# Patient Record
Sex: MALE | Race: AMERICAN INDIAN OR ALASKA NATIVE | Employment: FULL TIME | URBAN - METROPOLITAN AREA
[De-identification: names, ages, dates, MRNs, and addresses within clinical notes are randomized per-mention and may not be internally consistent; named-entity substitution may affect disease eponyms.]

---
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
## 2021-05-26 ENCOUNTER — HOSPITAL ENCOUNTER (EMERGENCY)
Facility: CLINIC | Age: 21
Discharge: HOME | End: 2021-05-26
Attending: EMERGENCY MEDICINE | Admitting: EMERGENCY MEDICINE
Payer: COMMERCIAL

## 2021-05-26 VITALS
TEMPERATURE: 98 F | WEIGHT: 160 LBS | HEART RATE: 85 BPM | RESPIRATION RATE: 16 BRPM | OXYGEN SATURATION: 99 % | BODY MASS INDEX: 25.82 KG/M2

## 2021-05-26 DIAGNOSIS — R07.89 CHEST WALL TENDERNESS: ICD-10-CM

## 2021-05-26 DIAGNOSIS — S29.011A PECTORALIS MUSCLE STRAIN, INITIAL ENCOUNTER: ICD-10-CM

## 2021-05-26 PROCEDURE — 250N000013 HC RX MED GY IP 250 OP 250 PS 637: Performed by: EMERGENCY MEDICINE

## 2021-05-26 PROCEDURE — 93005 ELECTROCARDIOGRAM TRACING: CPT

## 2021-05-26 PROCEDURE — 99283 EMERGENCY DEPT VISIT LOW MDM: CPT

## 2021-05-26 RX ORDER — IBUPROFEN 600 MG/1
600 TABLET, FILM COATED ORAL EVERY 6 HOURS PRN
Qty: 60 TABLET | Refills: 0 | Status: SHIPPED | OUTPATIENT
Start: 2021-05-26 | End: 2021-06-25

## 2021-05-26 RX ORDER — IBUPROFEN 600 MG/1
600 TABLET, FILM COATED ORAL ONCE
Status: COMPLETED | OUTPATIENT
Start: 2021-05-26 | End: 2021-05-26

## 2021-05-26 RX ORDER — ACETAMINOPHEN 500 MG
1000 TABLET ORAL ONCE
Status: COMPLETED | OUTPATIENT
Start: 2021-05-26 | End: 2021-05-26

## 2021-05-26 RX ADMIN — ACETAMINOPHEN 1000 MG: 500 TABLET, FILM COATED ORAL at 22:15

## 2021-05-26 RX ADMIN — IBUPROFEN 600 MG: 600 TABLET, FILM COATED ORAL at 22:15

## 2021-05-26 ASSESSMENT — ENCOUNTER SYMPTOMS
NECK PAIN: 0
VOMITING: 0
BACK PAIN: 0
FEVER: 0
SHORTNESS OF BREATH: 0
DIARRHEA: 0
SORE THROAT: 0
ABDOMINAL PAIN: 0
NAUSEA: 0

## 2021-05-26 NOTE — LETTER
May 27, 2021      To Whom It May Concern:      Juan Ramon Carlin was seen in our Emergency Department today, 05/26/21.  I expect his condition to improve over the next 2-3 days.  He may return to work/school when improved.    Sincerely,        Berenice Gaines RN

## 2021-05-27 LAB — INTERPRETATION ECG - MUSE: NORMAL

## 2021-05-27 NOTE — ED PROVIDER NOTES
History   Chief Complaint:  Chest Wall Pain     HPI   Juan Ramon Carlin is a 21 year old male who presents with chest wall pain. Patient complains of left pectoral pain with movement. His pain resolves when he stops moving. He states the pain began on 05/17/21 and resolved shortly after but returned today at work. His job is labor intensive and involves lifting piping. Denies shortness of breath, fever, or sore throat. Denies rashes, nausea, vomiting, diarrhea, or abdominal pain. Denies neck or back pain. Denies hypertension, diabetes, or high cholesterol. He drinks alcohol socially and smokes tobacco. He denies a history of heart problems or a family history of heart disease. He is right hand dominant.     Review of Systems   Constitutional: Negative for fever.   HENT: Negative for sore throat.    Respiratory: Negative for shortness of breath.    Cardiovascular: Positive for chest pain.   Gastrointestinal: Negative for abdominal pain, diarrhea, nausea and vomiting.   Musculoskeletal: Negative for back pain and neck pain.   Skin: Negative for rash.   All other systems reviewed and are negative.    Allergies:  No known drug allergies    Medications:  The patient is not currently taking any prescribed medications.    Past Medical History:    Chronic Right Shoulder Pain    Social History:  Arrives unaccompanied.     Physical Exam     Patient Vitals for the past 24 hrs:   Temp Temp src Pulse Resp SpO2 Weight   05/26/21 2137 98  F (36.7  C) Temporal 85 16 99 % 72.6 kg (160 lb)       Physical Exam  General: Alert, appears well-developed and well-nourished. Cooperative.     In no distress  HEENT:  Head:  Atraumatic  Ears:  External ears are normal  Mouth/Throat:  Oropharynx is without erythema or exudate and mucous membranes are moist.   Eyes:   Conjunctivae normal and EOM are normal. No scleral icterus.  CV:  Normal rate, regular rhythm, normal heart sounds and radial pulses are 2+ and symmetric.  No  murmur.  Resp:  Breath sounds are clear bilaterally    Non-labored, no retractions or accessory muscle use  GI:  Abdomen is soft, no distension, no tenderness. No rebound or guarding.  No CVA tenderness bilaterally  MS:  Normal range of motion. No edema.    Normal strength in all 4 extremities.     Back atraumatic.    No midline cervical, thoracic, or lumbar tenderness    The left pectoralis is tender to any movement of the left aspect of the chest or left upper extremity.  He has reproducible tenderness over the body of the pectoralis major muscle.  Skin:  Warm and dry.  No rash or lesions noted.  Neuro: Alert. Normal strength.  GCS: 15  Psych:  Normal mood and affect.    Emergency Department Course   ECG  ECG taken at 2202, ECG read at 2203  Sinus bradycardia   Otherwise normal ECG   Rate 55 bpm. FL interval 120 ms. QRS duration 88 ms. QT/QTc 420/401 ms. P-R-T axes 13 69 47.     Emergency Department Course:    Reviewed:  I reviewed nursing notes, vitals and past medical history    Assessments:  2203 I obtained history and examined the patient as noted above.     Interventions:  2215 Tylenol 1,000 mg PO  2215 Ibuprofen 600 mg PO    Disposition:  The patient was discharged to home.     Impression & Plan     Medical Decision Making:  Patient is a 21-year-old male who presents with left chest wall pain.  His pain is very reproducible on palpation consistent with a pectoralis strain.  He will continue with ibuprofen every 6 hours as needed and follow-up with his primary care provider.  EKG reassuringly shows no concerning ischemic changes or arrhythmias.  Follow-up with primary care indicated.  After all questions answered and return precautions understood, discharged home.    Diagnosis:    ICD-10-CM    1. Pectoralis muscle strain, initial encounter  S29.011A    2. Chest wall tenderness  R07.89        Discharge Medications:  Discharge Medication List as of 5/26/2021 10:22 PM      START taking these medications     Details   ibuprofen (ADVIL/MOTRIN) 600 MG tablet Take 1 tablet (600 mg) by mouth every 6 hours as needed for moderate pain or fever, Disp-60 tablet, R-0, Local Print             Scribe Disclosure:  I, Errol Orozco, am serving as a scribe at 9:51 PM on 5/26/2021 to document services personally performed by Lucas Méndez MD based on my observations and the provider's statements to me.            Lucas Méndez MD  05/26/21 [id number]

## 2021-05-27 NOTE — ED TRIAGE NOTES
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